# Patient Record
Sex: FEMALE | Race: WHITE | Employment: PART TIME | URBAN - METROPOLITAN AREA
[De-identification: names, ages, dates, MRNs, and addresses within clinical notes are randomized per-mention and may not be internally consistent; named-entity substitution may affect disease eponyms.]

---

## 2017-04-06 ENCOUNTER — TELEPHONE (OUTPATIENT)
Dept: INTERNAL MEDICINE CLINIC | Facility: CLINIC | Age: 57
End: 2017-04-06

## 2017-04-06 NOTE — TELEPHONE ENCOUNTER
Lm w/ pt's , Sharyle Argue ([id number] [address] per HIPAA), to have pt call back regarding need udpated ACT and AAP.  verbalized understanding, stating will inform pt.

## 2017-04-14 NOTE — TELEPHONE ENCOUNTER
Called patient, advised and reviewed confirmed AAP. Pt verbalized understanding and agreeable to plan.

## 2017-12-08 ENCOUNTER — PATIENT MESSAGE (OUTPATIENT)
Dept: INTERNAL MEDICINE CLINIC | Facility: CLINIC | Age: 57
End: 2017-12-08

## 2017-12-08 DIAGNOSIS — Z12.39 ENCOUNTER FOR SCREENING FOR MALIGNANT NEOPLASM OF BREAST: Primary | ICD-10-CM

## 2017-12-21 NOTE — TELEPHONE ENCOUNTER
From: Niko Lugo  To: JOHNNY Bashir  Sent: 12/8/2017 5:25 PM CST  Subject: Other    Please note that I had my routine colonoscopy done by Dr. Jarod Traore of [address] [facility] yesterday, 12/7/17.  She did remove one polyp and the pathology re

## 2018-01-22 ENCOUNTER — MED REC SCAN ONLY (OUTPATIENT)
Dept: INTERNAL MEDICINE CLINIC | Facility: CLINIC | Age: 58
End: 2018-01-22

## 2018-02-13 ENCOUNTER — HOSPITAL ENCOUNTER (EMERGENCY)
Facility: HOSPITAL | Age: 58
Discharge: HOME | End: 2018-02-13
Attending: EMERGENCY MEDICINE
Payer: COMMERCIAL

## 2018-02-13 VITALS
DIASTOLIC BLOOD PRESSURE: 60 MMHG | OXYGEN SATURATION: 98 % | HEIGHT: 67 IN | HEART RATE: 70 BPM | SYSTOLIC BLOOD PRESSURE: 133 MMHG | WEIGHT: 140 LBS | RESPIRATION RATE: 18 BRPM | BODY MASS INDEX: 21.97 KG/M2 | TEMPERATURE: 98 F

## 2018-02-13 DIAGNOSIS — K62.5 RECTAL BLEEDING: Primary | ICD-10-CM

## 2018-02-13 LAB
ALBUMIN SERPL-MCNC: 4.2 G/DL (ref 3.5–4.8)
ALP LIVER SERPL-CCNC: 95 U/L (ref 46–118)
ALT SERPL-CCNC: 20 U/L (ref 14–54)
AST SERPL-CCNC: 25 U/L (ref 15–41)
BASOPHILS # BLD AUTO: 0.06 X10(3) UL (ref 0–0.1)
BASOPHILS NFR BLD AUTO: 0.5 %
BILIRUB SERPL-MCNC: 0.4 MG/DL (ref 0.1–2)
BILIRUB UR QL STRIP.AUTO: NEGATIVE
BUN BLD-MCNC: 18 MG/DL (ref 8–20)
CALCIUM BLD-MCNC: 9.3 MG/DL (ref 8.3–10.3)
CHLORIDE: 108 MMOL/L (ref 101–111)
CLARITY UR REFRACT.AUTO: CLEAR
CO2: 26 MMOL/L (ref 22–32)
CREAT BLD-MCNC: 0.85 MG/DL (ref 0.55–1.02)
EOSINOPHIL # BLD AUTO: 0.17 X10(3) UL (ref 0–0.3)
EOSINOPHIL NFR BLD AUTO: 1.5 %
ERYTHROCYTE [DISTWIDTH] IN BLOOD BY AUTOMATED COUNT: 13.1 % (ref 11.5–16)
GLUCOSE BLD-MCNC: 116 MG/DL (ref 70–99)
GLUCOSE UR STRIP.AUTO-MCNC: NEGATIVE MG/DL
HCT VFR BLD AUTO: 45.2 % (ref 34–50)
HGB BLD-MCNC: 15.7 G/DL (ref 12–16)
IMMATURE GRANULOCYTE COUNT: 0.04 X10(3) UL (ref 0–1)
IMMATURE GRANULOCYTE RATIO %: 0.4 %
KETONES UR STRIP.AUTO-MCNC: NEGATIVE MG/DL
LEUKOCYTE ESTERASE UR QL STRIP.AUTO: NEGATIVE
LYMPHOCYTES # BLD AUTO: 1.89 X10(3) UL (ref 0.9–4)
LYMPHOCYTES NFR BLD AUTO: 16.5 %
M PROTEIN MFR SERPL ELPH: 7.3 G/DL (ref 6.1–8.3)
MCH RBC QN AUTO: 31.6 PG (ref 27–33.2)
MCHC RBC AUTO-ENTMCNC: 34.7 G/DL (ref 31–37)
MCV RBC AUTO: 90.9 FL (ref 81–100)
MONOCYTES # BLD AUTO: 0.82 X10(3) UL (ref 0.1–1)
MONOCYTES NFR BLD AUTO: 7.2 %
NEUTROPHIL ABS PRELIM: 8.44 X10 (3) UL (ref 1.3–6.7)
NEUTROPHILS # BLD AUTO: 8.44 X10(3) UL (ref 1.3–6.7)
NEUTROPHILS NFR BLD AUTO: 73.9 %
NITRITE UR QL STRIP.AUTO: NEGATIVE
PH UR STRIP.AUTO: 6 [PH] (ref 4.5–8)
PLATELET # BLD AUTO: 239 10(3)UL (ref 150–450)
POTASSIUM SERPL-SCNC: 3.8 MMOL/L (ref 3.6–5.1)
PROT UR STRIP.AUTO-MCNC: NEGATIVE MG/DL
RBC # BLD AUTO: 4.97 X10(6)UL (ref 3.8–5.1)
RBC UR QL AUTO: NEGATIVE
RED CELL DISTRIBUTION WIDTH-SD: 43.4 FL (ref 35.1–46.3)
SODIUM SERPL-SCNC: 141 MMOL/L (ref 136–144)
SP GR UR STRIP.AUTO: 1.01 (ref 1–1.03)
UROBILINOGEN UR STRIP.AUTO-MCNC: <2 MG/DL
WBC # BLD AUTO: 11.4 X10(3) UL (ref 4–13)

## 2018-02-13 PROCEDURE — 96361 HYDRATE IV INFUSION ADD-ON: CPT

## 2018-02-13 PROCEDURE — 80053 COMPREHEN METABOLIC PANEL: CPT

## 2018-02-13 PROCEDURE — 99284 EMERGENCY DEPT VISIT MOD MDM: CPT

## 2018-02-13 PROCEDURE — 82272 OCCULT BLD FECES 1-3 TESTS: CPT

## 2018-02-13 PROCEDURE — 80053 COMPREHEN METABOLIC PANEL: CPT | Performed by: EMERGENCY MEDICINE

## 2018-02-13 PROCEDURE — 81003 URINALYSIS AUTO W/O SCOPE: CPT | Performed by: EMERGENCY MEDICINE

## 2018-02-13 PROCEDURE — 85025 COMPLETE CBC W/AUTO DIFF WBC: CPT | Performed by: EMERGENCY MEDICINE

## 2018-02-13 PROCEDURE — 96360 HYDRATION IV INFUSION INIT: CPT

## 2018-02-13 RX ORDER — HYOSCYAMINE SULFATE 0.125 MG
125 TABLET ORAL EVERY 4 HOURS PRN
Qty: 12 TABLET | Refills: 0 | Status: SHIPPED | OUTPATIENT
Start: 2018-02-13 | End: 2018-05-31 | Stop reason: ALTCHOICE

## 2018-02-13 NOTE — ED INITIAL ASSESSMENT (HPI)
Diarrhea x5 this am starting at 0230. Bloody clots in toilet at 0600. No vomiting. Pt has been taking 4 aleve per day x2 days. Colonoscopy in December with 1 polyp removed.

## 2018-02-13 NOTE — ED PROVIDER NOTES
Patient Seen in: [facility] Emergency Department    History   Patient presents with:  GI Bleeding (gastrointestinal)    Stated Complaint: GI Bleed    HPI    76-year-old female presents with rectal bleeding.   She reports multiple episodes of diarrhea masses. No peritoneal signs. Rectal: There is an uncomplicated external hemorrhoid. Small rectal fissure with no evidence of bleeding. No stool in the rectal vault but fluid present is guaiac positive. No gross blood.   Extremities: no edema, normal pe patient's diarrhea. He would like to collect a stool sample if possible. She has been unable to provide a stool sample here in the emergency room so she is sent home with a container to collect.   Instructed to call gastroenterology for follow-up in the n

## 2018-05-30 ENCOUNTER — TELEPHONE (OUTPATIENT)
Dept: INTERNAL MEDICINE CLINIC | Facility: CLINIC | Age: 58
End: 2018-05-30

## 2018-05-30 NOTE — TELEPHONE ENCOUNTER
Pt stating [facility] and dx with bronchitis and sinus infection. Given 7 day course of \"generic\" Augmentin 875- 125mg- 2 days left on regimen. Given tessalon for cough- which has improved, has not needed for 2days.  Noted today af

## 2018-05-30 NOTE — TELEPHONE ENCOUNTER
Patient states [facility]. She was diagnosed with bronchitis and sinus infection. She noticed a rash today, under breast area. She is wanting to know if it may have something to do with medications prescribed?     Unable to tra

## 2018-05-31 ENCOUNTER — OFFICE VISIT (OUTPATIENT)
Dept: INTERNAL MEDICINE CLINIC | Facility: CLINIC | Age: 58
End: 2018-05-31

## 2018-05-31 VITALS
RESPIRATION RATE: 14 BRPM | HEIGHT: 67 IN | WEIGHT: 152 LBS | SYSTOLIC BLOOD PRESSURE: 122 MMHG | HEART RATE: 66 BPM | DIASTOLIC BLOOD PRESSURE: 70 MMHG | TEMPERATURE: 98 F | BODY MASS INDEX: 23.86 KG/M2

## 2018-05-31 DIAGNOSIS — R21 RASH: ICD-10-CM

## 2018-05-31 DIAGNOSIS — Z91.09 ENVIRONMENTAL ALLERGIES: ICD-10-CM

## 2018-05-31 DIAGNOSIS — J06.9 ACUTE URI: Primary | ICD-10-CM

## 2018-05-31 DIAGNOSIS — J45.20 MILD INTERMITTENT ASTHMA WITHOUT COMPLICATION: ICD-10-CM

## 2018-05-31 DIAGNOSIS — Z00.00 LABORATORY EXAMINATION ORDERED AS PART OF A COMPLETE PHYSICAL EXAMINATION: ICD-10-CM

## 2018-05-31 PROCEDURE — 99204 OFFICE O/P NEW MOD 45 MIN: CPT | Performed by: NURSE PRACTITIONER

## 2018-05-31 RX ORDER — AMOXICILLIN AND CLAVULANATE POTASSIUM 875; 125 MG/1; MG/1
1 TABLET, FILM COATED ORAL 2 TIMES DAILY
Qty: 8 TABLET | Refills: 0 | Status: SHIPPED | OUTPATIENT
Start: 2018-05-31 | End: 2018-06-10

## 2018-05-31 RX ORDER — BENZONATATE 100 MG/1
CAPSULE ORAL
COMMUNITY
Start: 2018-05-25 | End: 2018-05-31 | Stop reason: ALTCHOICE

## 2018-05-31 RX ORDER — AMOXICILLIN AND CLAVULANATE POTASSIUM 875; 125 MG/1; MG/1
TABLET, FILM COATED ORAL
COMMUNITY
Start: 2018-05-25 | End: 2018-08-27 | Stop reason: ALTCHOICE

## 2018-05-31 NOTE — PROGRESS NOTES
Ale Mitchell is a 59year old female. Patient presents with:  Rash: AB RM 10, Pt states having rash under breast X 1 day   Medication Request  Sinusitis      HPI:   Last ov 2015  Multiple issues. Overdue for health maintenance/labs/CPE/pap.   She is acu done recently [facility]   Will request record  Pap: due   Menstrual Cycle: post         L   Mammogram: overdue   Order placed. Bone Density:  Will discuss further at her CPE    Osteoperosis Prevention:    Osteoperosis Prevention was discussed.   Reviewe Amoxicillin-Pot Clavulanate 875-125 MG Oral Tab 8 tablet 0      Sig: Take 1 tablet by mouth 2 (two) times daily. Imaging & Consults:  None    No Follow-up on file. There are no Patient Instructions on file for this visit.

## 2018-08-27 ENCOUNTER — OFFICE VISIT (OUTPATIENT)
Dept: FAMILY MEDICINE CLINIC | Facility: CLINIC | Age: 58
End: 2018-08-27
Payer: COMMERCIAL

## 2018-08-27 VITALS
BODY MASS INDEX: 23.27 KG/M2 | HEIGHT: 67.5 IN | RESPIRATION RATE: 15 BRPM | DIASTOLIC BLOOD PRESSURE: 76 MMHG | WEIGHT: 150 LBS | HEART RATE: 84 BPM | SYSTOLIC BLOOD PRESSURE: 118 MMHG

## 2018-08-27 DIAGNOSIS — N30.00 ACUTE CYSTITIS WITHOUT HEMATURIA: Primary | ICD-10-CM

## 2018-08-27 LAB
APPEARANCE: CLEAR
MULTISTIX LOT#: NORMAL NUMERIC
PH, URINE: 6 (ref 4.5–8)
SPECIFIC GRAVITY: 1 (ref 1–1.03)
UROBILINOGEN,SEMI-QN: 0.2 MG/DL (ref 0–1.9)

## 2018-08-27 PROCEDURE — 87088 URINE BACTERIA CULTURE: CPT | Performed by: FAMILY MEDICINE

## 2018-08-27 PROCEDURE — 87086 URINE CULTURE/COLONY COUNT: CPT | Performed by: FAMILY MEDICINE

## 2018-08-27 PROCEDURE — 99213 OFFICE O/P EST LOW 20 MIN: CPT | Performed by: FAMILY MEDICINE

## 2018-08-27 PROCEDURE — 87186 SC STD MICRODIL/AGAR DIL: CPT | Performed by: FAMILY MEDICINE

## 2018-08-27 PROCEDURE — 81003 URINALYSIS AUTO W/O SCOPE: CPT | Performed by: FAMILY MEDICINE

## 2018-08-27 RX ORDER — CIPROFLOXACIN 500 MG/1
500 TABLET, FILM COATED ORAL 2 TIMES DAILY
Qty: 10 TABLET | Refills: 0 | Status: SHIPPED | OUTPATIENT
Start: 2018-08-27 | End: 2018-09-01

## 2018-08-27 NOTE — PROGRESS NOTES
Maude Bustillo is a 59year old female. HPI:   Patient presents with symptoms of UTI for 2 days. Complaining of urinary frequency, urgency, dysuria, mild suprapubic pain. Denies back pain, fever, hematuria.   Pt has remote history of UTI in past. 1.030   OCCULT BLOOD trace Negative   PH, URINE 6.0 4.5 - 8.0   PROTEIN (URINE DIPSTICK) neg Negative/Trace mg/dL   UROBILINOGEN,SEMI-QN 0.2 0.0 - 1.9 mg/dL   NITRITE, URINE neg Negative   LEUKOCYTES neg Negative   APPEARANCE clear Clear   URINE-COLOR tace

## 2018-08-27 NOTE — PATIENT INSTRUCTIONS
Take antibiotics with food and plenty of water. Eat yogurt daily or use probiotics. (Femdophilus is a good example of an OTC probiotic)  Make sure to finish the entire antibiotic treatment.   We will send the urine specimen for culture and call you in 2-3

## 2018-09-28 ENCOUNTER — HOSPITAL ENCOUNTER (OUTPATIENT)
Dept: MAMMOGRAPHY | Age: 58
Discharge: HOME | End: 2018-09-28
Attending: NURSE PRACTITIONER
Payer: COMMERCIAL

## 2018-09-28 DIAGNOSIS — Z12.39 ENCOUNTER FOR SCREENING FOR MALIGNANT NEOPLASM OF BREAST: ICD-10-CM

## 2018-09-28 PROCEDURE — 77063 BREAST TOMOSYNTHESIS BI: CPT | Performed by: NURSE PRACTITIONER

## 2018-09-28 PROCEDURE — 77067 SCR MAMMO BI INCL CAD: CPT | Performed by: NURSE PRACTITIONER

## 2018-12-05 ENCOUNTER — OFFICE VISIT (OUTPATIENT)
Dept: FAMILY MEDICINE CLINIC | Facility: CLINIC | Age: 58
End: 2018-12-05
Payer: COMMERCIAL

## 2018-12-05 VITALS
TEMPERATURE: 98 F | OXYGEN SATURATION: 98 % | SYSTOLIC BLOOD PRESSURE: 118 MMHG | HEART RATE: 75 BPM | DIASTOLIC BLOOD PRESSURE: 70 MMHG

## 2018-12-05 DIAGNOSIS — N30.00 ACUTE CYSTITIS WITHOUT HEMATURIA: ICD-10-CM

## 2018-12-05 DIAGNOSIS — R30.0 DYSURIA: Primary | ICD-10-CM

## 2018-12-05 PROCEDURE — 99213 OFFICE O/P EST LOW 20 MIN: CPT | Performed by: FAMILY MEDICINE

## 2018-12-05 PROCEDURE — 81003 URINALYSIS AUTO W/O SCOPE: CPT | Performed by: FAMILY MEDICINE

## 2018-12-05 RX ORDER — CIPROFLOXACIN 500 MG/1
500 TABLET, FILM COATED ORAL 2 TIMES DAILY
Qty: 10 TABLET | Refills: 0 | Status: SHIPPED | OUTPATIENT
Start: 2018-12-05 | End: 2018-12-10

## 2018-12-05 NOTE — PATIENT INSTRUCTIONS
Take antibiotics with food and plenty of water. Eat yogurt daily or use probiotics. (Berenice Freitas is a good example of an OTC probiotic)  Make sure to finish the entire antibiotic treatment. You may take otc pyridium for urinary discomfort if needed.    Increase

## 2018-12-05 NOTE — PROGRESS NOTES
Bernabe Sandoval is a 59year old female. HPI:   Patient presents with symptoms of UTI for 3 days. Complaining of urinary frequency, urgency, dysuria, mild suprapubic pain. Denies back pain, fever, hematuria.   Pt has history of UTI in past.  Last episod Negative    KETONES (URINE DIPSTICK) Neg Negative mg/dL    SPECIFIC GRAVITY 1.010 1.005 - 1.030    OCCULT BLOOD Mod Negative    PH, URINE 6.5 4.5 - 8.0    PROTEIN (URINE DIPSTICK) neg Negative/Trace mg/dL    UROBILINOGEN,SEMI-QN 0.2 0.0 - 1.9 mg/dL    NITR

## 2019-01-29 ENCOUNTER — OFFICE VISIT (OUTPATIENT)
Dept: FAMILY MEDICINE CLINIC | Facility: CLINIC | Age: 59
End: 2019-01-29
Payer: COMMERCIAL

## 2019-01-29 VITALS
HEART RATE: 78 BPM | DIASTOLIC BLOOD PRESSURE: 66 MMHG | OXYGEN SATURATION: 98 % | RESPIRATION RATE: 14 BRPM | TEMPERATURE: 99 F | WEIGHT: 145 LBS | BODY MASS INDEX: 22.49 KG/M2 | HEIGHT: 67.5 IN | SYSTOLIC BLOOD PRESSURE: 120 MMHG

## 2019-01-29 DIAGNOSIS — N30.01 ACUTE CYSTITIS WITH HEMATURIA: Primary | ICD-10-CM

## 2019-01-29 LAB
APPEARANCE: CLEAR
MULTISTIX LOT#: ABNORMAL NUMERIC
PH, URINE: 6 (ref 4.5–8)
SPECIFIC GRAVITY: 1.01 (ref 1–1.03)
URINE-COLOR: YELLOW
UROBILINOGEN,SEMI-QN: 0.2 MG/DL (ref 0–1.9)

## 2019-01-29 PROCEDURE — 99213 OFFICE O/P EST LOW 20 MIN: CPT | Performed by: NURSE PRACTITIONER

## 2019-01-29 PROCEDURE — 87086 URINE CULTURE/COLONY COUNT: CPT | Performed by: NURSE PRACTITIONER

## 2019-01-29 PROCEDURE — 81003 URINALYSIS AUTO W/O SCOPE: CPT | Performed by: NURSE PRACTITIONER

## 2019-01-29 RX ORDER — CEPHALEXIN 500 MG/1
500 CAPSULE ORAL 2 TIMES DAILY
Qty: 20 CAPSULE | Refills: 0 | Status: SHIPPED | OUTPATIENT
Start: 2019-01-29 | End: 2019-02-08

## 2019-01-29 NOTE — PROGRESS NOTES
CHIEF COMPLAINT:   Patient presents with:  Urinary Symptoms (urologic): dysuria, urgency x 4 dys was seen on Dec, 5th for same sx       HPI:   Maude Bustillo is a 59year old female who presents with symptoms of UTI.  Complaining of urinary frequency, urg murmurs  LUNGS: clear to ausculation bilaterally, no wheezing or rhonchi  GI: BS present x 4. No hepatosplenomegaly, No tenderness  : Mild suprapubic tenderness; No bladder distention;  No CVAT   EXTREMITIES: no edema    Recent Results (from the past 24 primary care provider immediately if you experience nausea, vomiting, or fever. What can you do to help prevent bladder infections? Urinate often during the day. You should also urinate after you have sex.    If you are a woman, it is important to:   Ke

## 2019-03-09 ENCOUNTER — OFFICE VISIT (OUTPATIENT)
Dept: FAMILY MEDICINE CLINIC | Facility: CLINIC | Age: 59
End: 2019-03-09
Payer: COMMERCIAL

## 2019-03-09 VITALS
HEART RATE: 75 BPM | DIASTOLIC BLOOD PRESSURE: 72 MMHG | BODY MASS INDEX: 22.76 KG/M2 | HEIGHT: 67 IN | OXYGEN SATURATION: 99 % | SYSTOLIC BLOOD PRESSURE: 122 MMHG | WEIGHT: 145 LBS | TEMPERATURE: 98 F | RESPIRATION RATE: 14 BRPM

## 2019-03-09 DIAGNOSIS — N30.01 ACUTE CYSTITIS WITH HEMATURIA: Primary | ICD-10-CM

## 2019-03-09 LAB
APPEARANCE: CLEAR
PH, URINE: 7 (ref 4.5–8)
SPECIFIC GRAVITY: <=1.005 (ref 1–1.03)
URINE-COLOR: YELLOW
UROBILINOGEN,SEMI-QN: 0.2 MG/DL (ref 0–1.9)

## 2019-03-09 PROCEDURE — 87086 URINE CULTURE/COLONY COUNT: CPT | Performed by: NURSE PRACTITIONER

## 2019-03-09 PROCEDURE — 81003 URINALYSIS AUTO W/O SCOPE: CPT | Performed by: NURSE PRACTITIONER

## 2019-03-09 PROCEDURE — 99213 OFFICE O/P EST LOW 20 MIN: CPT | Performed by: NURSE PRACTITIONER

## 2019-03-09 RX ORDER — NITROFURANTOIN 25; 75 MG/1; MG/1
100 CAPSULE ORAL 2 TIMES DAILY
Qty: 14 CAPSULE | Refills: 0 | Status: SHIPPED | OUTPATIENT
Start: 2019-03-09 | End: 2019-03-16

## 2019-03-09 NOTE — PATIENT INSTRUCTIONS
· Complete full course of antibiotics. · Over the counter Tylenol/Motrin as needed for pain/discomfort. · Follow up in 2-3 days if symptoms do not improve or worsen.     · Urine culture sent  · Return to clinic or see your primary care provider immediatel until all of the medicine is gone. If you stop taking the antibiotic too soon, the infection may not go away. You may also develop a resistance to the antibiotic. This can make it much harder to treat.   Lifestyle changes to treat and prevent UTIs  The life

## 2019-03-09 NOTE — PROGRESS NOTES
CHIEF COMPLAINT:   Patient presents with:  Urinary Symptoms (urologic): states this is her 4th UTI since August, hematuria, started last night       HPI:   Jess Kaur is a 62year old female who presents with symptoms of UTI.  Complaining of urinary f LUNGS: clear to ausculation bilaterally, no wheezing or rhonchi  GI: BS present x 4. No hepatosplenomegaly, No tenderness  : No suprapubic tenderness; no bladder distention;  No CVAT   EXTREMITIES: no edema    Recent Results (from the past 24 hour(s)) Sig: Take 1 capsule (100 mg total) by mouth 2 (two) times daily for 7 days. Patient Instructions   · Complete full course of antibiotics. · Over the counter Tylenol/Motrin as needed for pain/discomfort.   · Follow up in 2-3 days if symptoms do no Most UTIs are treated with antibiotics. These kill the bacteria. The length of time you need to take them depends on the type of infection. It may be as short as 3 days. If you have repeated UTIs, you may need a low-dose antibiotic for several months.  Take © 1266-2968 [employer] [address] [facility] [place] [place]. All rights reserved. This information is not intended as a substitute for professional medical care. Always follow your healthcare professional's instructions.               Regine Lao

## 2019-03-11 ENCOUNTER — TELEPHONE (OUTPATIENT)
Dept: INTERNAL MEDICINE CLINIC | Facility: CLINIC | Age: 59
End: 2019-03-11

## 2019-03-11 NOTE — TELEPHONE ENCOUNTER
Spoke with patient stating was seen at [facility] on 3/9/2019 for UTI, patient stating is taking antibiotic given at [facility] still feels bladder pressure, no hematuria since Saturday, no dysuria, no fevers, no chills, no nausea, no vomiting.  Patient is scheduled with

## 2019-03-11 NOTE — TELEPHONE ENCOUNTER
Pt went to [facility] 3/9/19 and has uti again-they told her to check w/pcp and see if maybe she should go to urologist now since she has had several uti's lately-this uti did have blood in it as well

## 2019-03-11 NOTE — TELEPHONE ENCOUNTER
Pt called again to find out what she is to do-is hoping to get a call back today to see what she needs to do.

## 2019-03-11 NOTE — TELEPHONE ENCOUNTER
Patient has had multiple visits at [facility] for UTI, she currently is taking antibiotic Nitrofurantoin prescribed by Barbara Wong on 3/9/2019 negative for infection but showed hematuria, last dose will be 3/16/2019, would you like her to come in for FU OV here firs

## 2019-03-12 NOTE — TELEPHONE ENCOUNTER
LM on  at [phone] asking patient to call back if would like to be seen today, per SD patient should keep appt with CB. Patient to call back with any further questions.

## 2019-03-12 NOTE — TELEPHONE ENCOUNTER
Please confirm she is stable to wait until CB tomorrow. Will possibly need to see urology but will not be able to see them soon.   Should keep her appt with CB

## 2019-03-13 ENCOUNTER — OFFICE VISIT (OUTPATIENT)
Dept: INTERNAL MEDICINE CLINIC | Facility: CLINIC | Age: 59
End: 2019-03-13
Payer: COMMERCIAL

## 2019-03-13 VITALS
BODY MASS INDEX: 23.86 KG/M2 | DIASTOLIC BLOOD PRESSURE: 78 MMHG | SYSTOLIC BLOOD PRESSURE: 130 MMHG | HEART RATE: 84 BPM | WEIGHT: 152 LBS | RESPIRATION RATE: 16 BRPM | HEIGHT: 67 IN

## 2019-03-13 DIAGNOSIS — R31.0 GROSS HEMATURIA: Primary | ICD-10-CM

## 2019-03-13 DIAGNOSIS — R30.0 DYSURIA: ICD-10-CM

## 2019-03-13 DIAGNOSIS — J45.20 MILD INTERMITTENT ASTHMA WITHOUT COMPLICATION: ICD-10-CM

## 2019-03-13 LAB
APPEARANCE: CLEAR
BILIRUBIN: NEGATIVE
GLUCOSE (URINE DIPSTICK): NEGATIVE MG/DL
LEUKOCYTES: NEGATIVE
MULTISTIX LOT#: NORMAL NUMERIC
NITRITE, URINE: NEGATIVE
OCCULT BLOOD: NEGATIVE
PH, URINE: 5.5 (ref 4.5–8)
PROTEIN (URINE DIPSTICK): NEGATIVE MG/DL
SPECIFIC GRAVITY: 1 (ref 1–1.03)
URINE-COLOR: YELLOW
UROBILINOGEN,SEMI-QN: 0.2 MG/DL (ref 0–1.9)

## 2019-03-13 PROCEDURE — 81003 URINALYSIS AUTO W/O SCOPE: CPT | Performed by: PHYSICIAN ASSISTANT

## 2019-03-13 PROCEDURE — 99214 OFFICE O/P EST MOD 30 MIN: CPT | Performed by: PHYSICIAN ASSISTANT

## 2019-03-13 NOTE — PROGRESS NOTES
Patient presents with:  UTI: Pt is here for a f/u from  for UTI. Pt states she is having frequent UTI's since 8/27/18. Pt states she is no longer having symptoms and currently taking antibiotics. LB-rm 2      HPI:  Pt presents for f/u of dysuria.   States noted. No erythema. No pallor. ACT:  25    UA:  negative    A/P:    Gross hematuria  (primary encounter diagnosis) - Will need full work-up, pt is in agreement. CT abd pelvis ordered. Pt also referred to Urology ([facility] or [facility]) for cystoscopy.     Dysur

## 2019-03-14 ENCOUNTER — TELEPHONE (OUTPATIENT)
Dept: INTERNAL MEDICINE CLINIC | Facility: CLINIC | Age: 59
End: 2019-03-14

## 2019-03-14 NOTE — TELEPHONE ENCOUNTER
Spoke to patient regarding her auth for her upcoming CT of abdomen and pelvis. I explained to her it is still pending auth. Pt states she has it scheduled for next Weds. 3/20/19. She will call me back on Tues. 3/19/19 to see it test has been approved.

## 2019-03-20 ENCOUNTER — HOSPITAL ENCOUNTER (OUTPATIENT)
Dept: CT IMAGING | Facility: HOSPITAL | Age: 59
Discharge: HOME | End: 2019-03-20
Attending: PHYSICIAN ASSISTANT
Payer: COMMERCIAL

## 2019-03-20 DIAGNOSIS — R31.0 GROSS HEMATURIA: ICD-10-CM

## 2019-03-20 LAB — CREAT SERPL-MCNC: 0.8 MG/DL (ref 0.55–1.02)

## 2019-03-20 PROCEDURE — 82565 ASSAY OF CREATININE: CPT

## 2019-03-20 PROCEDURE — 74178 CT ABD&PLV WO CNTR FLWD CNTR: CPT | Performed by: PHYSICIAN ASSISTANT

## 2019-03-20 NOTE — TELEPHONE ENCOUNTER
Pt would like Radha STARKEY to call her to discuss if the MRI is covered by her insurance.       Pls call Pt at .[phone]

## 2019-03-20 NOTE — PROGRESS NOTES
Results reviewed with pt. L kidney stone, non-obstructive. Pt has appt with Urology, Angelika next month. Also discussed gallbladder finding, fold vs mass vs stone. She was informed of need to do MRI/MRCP to further evaluate.   She is agreeable and will proc

## 2019-03-28 ENCOUNTER — HOSPITAL ENCOUNTER (OUTPATIENT)
Dept: MRI IMAGING | Facility: HOSPITAL | Age: 59
Discharge: HOME | End: 2019-03-28
Attending: PHYSICIAN ASSISTANT
Payer: COMMERCIAL

## 2019-03-28 DIAGNOSIS — R93.5 ABNORMAL CT OF THE ABDOMEN: ICD-10-CM

## 2019-03-28 PROCEDURE — 74183 MRI ABD W/O CNTR FLWD CNTR: CPT | Performed by: PHYSICIAN ASSISTANT

## 2019-03-28 PROCEDURE — 76376 3D RENDER W/INTRP POSTPROCES: CPT | Performed by: PHYSICIAN ASSISTANT

## 2019-03-28 PROCEDURE — A9575 INJ GADOTERATE MEGLUMI 0.1ML: HCPCS | Performed by: PHYSICIAN ASSISTANT

## 2019-03-29 ENCOUNTER — TELEPHONE (OUTPATIENT)
Dept: INTERNAL MEDICINE CLINIC | Facility: CLINIC | Age: 59
End: 2019-03-29

## 2019-03-29 NOTE — PROGRESS NOTES
I called and reviewed results with pt personally. Either gallbladder fold or normal variant (Phrygian cap). Discussed with Dr. Valentino Phelan. Will plan for US in 3-4 mos to re-eval.  Pt agreeable to plan. All questions were answered.   She will f/u with me

## 2019-04-11 ENCOUNTER — HOSPITAL ENCOUNTER (OUTPATIENT)
Dept: ULTRASOUND IMAGING | Facility: HOSPITAL | Age: 59
Discharge: HOME | End: 2019-04-11
Attending: PHYSICIAN ASSISTANT
Payer: COMMERCIAL

## 2019-04-11 DIAGNOSIS — Z86.018 HISTORY OF UTERINE FIBROID: ICD-10-CM

## 2019-04-11 DIAGNOSIS — R14.0 ABDOMINAL BLOATING: ICD-10-CM

## 2019-04-11 PROCEDURE — 76830 TRANSVAGINAL US NON-OB: CPT | Performed by: PHYSICIAN ASSISTANT

## 2019-04-11 PROCEDURE — 76856 US EXAM PELVIC COMPLETE: CPT | Performed by: PHYSICIAN ASSISTANT

## 2019-04-11 NOTE — PROGRESS NOTES
Two uterine fibroids. One does appear to be exophytic (outward instead of inward on the uterus) and is over 2 cm in size. I would suggest she f/u with Gyn regarding this.   If she does not have a Gyn then recommend Bella Barraza with [employer] [facility].

## 2019-08-04 ENCOUNTER — PATIENT OUTREACH (OUTPATIENT)
Dept: INTERNAL MEDICINE CLINIC | Facility: CLINIC | Age: 59
End: 2019-08-04

## 2019-10-03 ENCOUNTER — TELEPHONE (OUTPATIENT)
Dept: INTERNAL MEDICINE CLINIC | Facility: CLINIC | Age: 59
End: 2019-10-03

## 2019-10-03 DIAGNOSIS — Z13.0 SCREENING FOR BLOOD DISEASE: ICD-10-CM

## 2019-10-03 DIAGNOSIS — Z12.31 ENCOUNTER FOR SCREENING MAMMOGRAM FOR MALIGNANT NEOPLASM OF BREAST: Primary | ICD-10-CM

## 2019-10-03 DIAGNOSIS — Z13.220 SCREENING FOR LIPOID DISORDERS: ICD-10-CM

## 2019-10-03 DIAGNOSIS — Z13.29 SCREENING FOR THYROID DISORDER: ICD-10-CM

## 2019-10-03 DIAGNOSIS — Z13.228 SCREENING FOR METABOLIC DISORDER: ICD-10-CM

## 2019-10-03 DIAGNOSIS — Z00.00 ROUTINE GENERAL MEDICAL EXAMINATION AT A HEALTH CARE FACILITY: Primary | ICD-10-CM

## 2019-10-03 DIAGNOSIS — J45.20 MILD INTERMITTENT ASTHMA WITHOUT COMPLICATION: ICD-10-CM

## 2019-10-03 NOTE — TELEPHONE ENCOUNTER
9/28/18 last mammogram-Bilateral Screening Juan 2D 3D Mammogram ordered per protocol. Send TE to [facility] for [facility]PE labs for 11/6/19 appt.

## 2019-10-03 NOTE — TELEPHONE ENCOUNTER
Future Appointments   Date Time Provider Janie Sharlene   11/6/2019  8:00 AM Jannette Funez PA-C [facility]     Please place orders for Mammogram with [phone] Patient is having physical with At[facility].

## 2019-10-03 NOTE — TELEPHONE ENCOUNTER
Patient is scheduled for Annual Physical.  Please place orders with [facility].  Patient will be fasting.     Future Appointments   Date Time Provider Janie uS   11/6/2019  8:00 AM Mini Miguel PA-C [facility]

## 2019-10-16 ENCOUNTER — HOSPITAL ENCOUNTER (OUTPATIENT)
Dept: MAMMOGRAPHY | Age: 59
Discharge: HOME | End: 2019-10-16
Attending: PHYSICIAN ASSISTANT
Payer: COMMERCIAL

## 2019-10-16 DIAGNOSIS — Z12.31 ENCOUNTER FOR SCREENING MAMMOGRAM FOR MALIGNANT NEOPLASM OF BREAST: ICD-10-CM

## 2019-10-16 PROCEDURE — 77067 SCR MAMMO BI INCL CAD: CPT | Performed by: PHYSICIAN ASSISTANT

## 2019-10-16 PROCEDURE — 77063 BREAST TOMOSYNTHESIS BI: CPT | Performed by: PHYSICIAN ASSISTANT

## 2019-10-28 NOTE — TELEPHONE ENCOUNTER
Orders were not placed to [facility]. Please placed them to [facility] pt would like a call back [phone]

## 2019-11-04 DIAGNOSIS — R73.9 HYPERGLYCEMIA: Primary | ICD-10-CM

## 2019-11-04 NOTE — TELEPHONE ENCOUNTER
Pt has upcoming OV on 11/6/19. I placed an order for an A1c to [facility].  Could you please call [facility] and see if they can add to the blood in lab. Thx.  I will review results with pt at [address]

## 2019-11-06 ENCOUNTER — OFFICE VISIT (OUTPATIENT)
Dept: INTERNAL MEDICINE CLINIC | Facility: CLINIC | Age: 59
End: 2019-11-06
Payer: COMMERCIAL

## 2019-11-06 VITALS
HEART RATE: 72 BPM | HEIGHT: 67 IN | BODY MASS INDEX: 24.52 KG/M2 | DIASTOLIC BLOOD PRESSURE: 72 MMHG | SYSTOLIC BLOOD PRESSURE: 110 MMHG | TEMPERATURE: 98 F | WEIGHT: 156.19 LBS

## 2019-11-06 DIAGNOSIS — R93.2 ABNORMAL CT SCAN, GALLBLADDER: ICD-10-CM

## 2019-11-06 DIAGNOSIS — Z11.51 SCREENING FOR HPV (HUMAN PAPILLOMAVIRUS): ICD-10-CM

## 2019-11-06 DIAGNOSIS — Z00.00 ANNUAL PHYSICAL EXAM: Primary | ICD-10-CM

## 2019-11-06 DIAGNOSIS — E78.5 DYSLIPIDEMIA: ICD-10-CM

## 2019-11-06 DIAGNOSIS — J45.20 MILD INTERMITTENT ASTHMA WITHOUT COMPLICATION: ICD-10-CM

## 2019-11-06 DIAGNOSIS — R93.5 ABNORMAL MRI OF ABDOMEN: ICD-10-CM

## 2019-11-06 DIAGNOSIS — R31.9 HEMATURIA, UNSPECIFIED TYPE: ICD-10-CM

## 2019-11-06 DIAGNOSIS — Z12.4 CERVICAL CANCER SCREENING: ICD-10-CM

## 2019-11-06 PROCEDURE — 99396 PREV VISIT EST AGE 40-64: CPT | Performed by: PHYSICIAN ASSISTANT

## 2019-11-06 PROCEDURE — 88175 CYTOPATH C/V AUTO FLUID REDO: CPT | Performed by: PHYSICIAN ASSISTANT

## 2019-11-06 PROCEDURE — 87624 HPV HI-RISK TYP POOLED RSLT: CPT | Performed by: PHYSICIAN ASSISTANT

## 2019-11-06 RX ORDER — ALBUTEROL SULFATE 90 UG/1
2 AEROSOL, METERED RESPIRATORY (INHALATION) EVERY 4 HOURS PRN
Qty: 1 INHALER | Refills: 1 | Status: SHIPPED | OUTPATIENT
Start: 2019-11-06

## 2019-11-06 NOTE — PROGRESS NOTES
Patient presents with:  Physical: Rm 2 DD Physical/Pap  Vaccinations: had 10/19      HPI:  Pt presents for annual physical.  She has not had a pap in years and is due. Pt is in a monogamous relationship. She denies h/o abnormal paps.     Chol - TGs much h Cancer Paternal Aunt    • Other (rectal cancer) Brother      Social History    Tobacco Use      Smoking status: Never Smoker      Smokeless tobacco: Never Used    Alcohol use:  Yes      Alcohol/week: 1.0 standard drinks      Types: 1 Standard drinks or equi Bilateral without masses, axillary nodes, nipple discharge and skin changes  Pulmonary/Chest: Effort normal and breath sounds normal. No respiratory distress. No wheezes, rhonchi or rales  Abdominal: Soft.  Bowel sounds are normal. Non tender, no masses, no 7.3  % Final   • EOSINOPHILS 11/01/2019 1.4  % Final   • BASOPHILS 11/01/2019 1.4  % Final   • GLUCOSE 11/01/2019 107* 65 - 99 mg/dL Final   • UREA NITROGEN (BUN) 11/01/2019 14  7 - 25 mg/dL Final   • CREATININE 11/01/2019 0.89  0.50 - 0.99 mg/dL Final   • (primary encounter diagnosis) - Pap with HPV today. Reviewed SBEs. Screening labs reviewed with pt. Mammo and colonoscopy is UTD. Mild intermittent asthma without complication - Stable. Continue Albuterol prn  Avoid dog dander.   Abnormal mri of abdome

## 2019-11-11 PROBLEM — R31.29 OTHER MICROSCOPIC HEMATURIA: Status: ACTIVE | Noted: 2019-03-09

## 2019-12-31 ENCOUNTER — HOSPITAL ENCOUNTER (OUTPATIENT)
Dept: ULTRASOUND IMAGING | Age: 59
Discharge: HOME | End: 2019-12-31
Attending: PHYSICIAN ASSISTANT
Payer: COMMERCIAL

## 2019-12-31 DIAGNOSIS — R93.5 ABNORMAL MRI OF ABDOMEN: ICD-10-CM

## 2019-12-31 DIAGNOSIS — R93.2 ABNORMAL CT SCAN, GALLBLADDER: ICD-10-CM

## 2019-12-31 DIAGNOSIS — K76.9 LESION OF LIVER: ICD-10-CM

## 2019-12-31 DIAGNOSIS — R93.5 ABNORMAL US (ULTRASOUND) OF ABDOMEN: Primary | ICD-10-CM

## 2019-12-31 PROCEDURE — 76700 US EXAM ABDOM COMPLETE: CPT | Performed by: PHYSICIAN ASSISTANT

## 2019-12-31 NOTE — PROGRESS NOTES
US is showing a couple of findings not definitely seen on prior imaging; these are likely benign hemangiomas (collection of blood vessels). Gallbladder findings appear smaller on US compared to prior imaging.   I would like pt to see Dr. Vivien Mane (GI liver sp

## 2020-01-31 ENCOUNTER — TELEPHONE (OUTPATIENT)
Dept: SURGERY | Facility: CLINIC | Age: 60
End: 2020-01-31

## 2020-01-31 DIAGNOSIS — K76.9 LESION OF LIVER GREATER THAN 1 CM IN DIAMETER WITH NO LIVER DISEASE OR HISTORY OF MALIGNANT NEOPLASM: Primary | ICD-10-CM

## 2020-01-31 NOTE — TELEPHONE ENCOUNTER
Spoke to patient confirming need for consult with Dr. Arturo Benson. Ordered repeat MRI. Patient instructed to get MRI prior to consult. Explained rationale for recommendation and addressed patient's questions.  Patient expressed reluctance but ultimately seemed

## 2020-02-06 ENCOUNTER — HOSPITAL ENCOUNTER (OUTPATIENT)
Dept: MRI IMAGING | Age: 60
Discharge: HOME | End: 2020-02-06
Attending: INTERNAL MEDICINE
Payer: COMMERCIAL

## 2020-02-06 DIAGNOSIS — K76.9 LESION OF LIVER GREATER THAN 1 CM IN DIAMETER WITH NO LIVER DISEASE OR HISTORY OF MALIGNANT NEOPLASM: ICD-10-CM

## 2020-02-06 PROCEDURE — A9581 GADOXETATE DISODIUM INJ: HCPCS

## 2020-02-06 PROCEDURE — 74183 MRI ABD W/O CNTR FLWD CNTR: CPT | Performed by: INTERNAL MEDICINE

## 2020-02-10 ENCOUNTER — TELEPHONE (OUTPATIENT)
Dept: SURGERY | Facility: CLINIC | Age: 60
End: 2020-02-10

## 2020-02-10 DIAGNOSIS — K82.4 GALLBLADDER POLYP: Primary | ICD-10-CM

## 2020-02-11 ENCOUNTER — TELEPHONE (OUTPATIENT)
Dept: SURGERY | Facility: CLINIC | Age: 60
End: 2020-02-11

## 2020-02-11 NOTE — TELEPHONE ENCOUNTER
Left VM confirming receipt and restating message from yesterday stating no further follow-up needed regarding liver lesion; however patient should have repeat imaging done and follow-up in 1 year (ordered) for gallbladder polyp.     Philippe Dorado, APRN  Nurs

## 2021-06-03 ENCOUNTER — HOSPITAL ENCOUNTER (OUTPATIENT)
Age: 61
Discharge: HOME | End: 2021-06-03
Payer: COMMERCIAL

## 2021-06-03 ENCOUNTER — APPOINTMENT (OUTPATIENT)
Dept: GENERAL RADIOLOGY | Age: 61
End: 2021-06-03
Attending: PHYSICIAN ASSISTANT
Payer: COMMERCIAL

## 2021-06-03 VITALS
TEMPERATURE: 98 F | HEART RATE: 73 BPM | WEIGHT: 150 LBS | OXYGEN SATURATION: 98 % | HEIGHT: 67.5 IN | DIASTOLIC BLOOD PRESSURE: 61 MMHG | RESPIRATION RATE: 18 BRPM | SYSTOLIC BLOOD PRESSURE: 130 MMHG | BODY MASS INDEX: 23.27 KG/M2

## 2021-06-03 DIAGNOSIS — S92.355A CLOSED NONDISPLACED FRACTURE OF FIFTH METATARSAL BONE OF LEFT FOOT, INITIAL ENCOUNTER: Primary | ICD-10-CM

## 2021-06-03 PROCEDURE — 73590 X-RAY EXAM OF LOWER LEG: CPT | Performed by: PHYSICIAN ASSISTANT

## 2021-06-03 PROCEDURE — E0114 CRUTCH UNDERARM PAIR NO WOOD: HCPCS | Performed by: PHYSICIAN ASSISTANT

## 2021-06-03 PROCEDURE — 29515 APPLICATION SHORT LEG SPLINT: CPT | Performed by: PHYSICIAN ASSISTANT

## 2021-06-03 PROCEDURE — 73630 X-RAY EXAM OF FOOT: CPT | Performed by: PHYSICIAN ASSISTANT

## 2021-06-03 PROCEDURE — 99213 OFFICE O/P EST LOW 20 MIN: CPT | Performed by: PHYSICIAN ASSISTANT

## 2021-06-03 RX ORDER — IBUPROFEN 600 MG/1
600 TABLET ORAL ONCE
Status: COMPLETED | OUTPATIENT
Start: 2021-06-03 | End: 2021-06-03

## 2021-06-03 NOTE — ED PROVIDER NOTES
Patient Seen in: Immediate [address]      History   Patient presents with:  Leg or Foot Injury    Stated Complaint: LEFT FOOT PAIN    HPI/Subjective:   HPI    75-year-old female presents to the immediate care with left foot pain since yesterd is normal.   Musculoskeletal:      Cervical back: Normal range of motion and neck supple. Comments: Mild pain elicited along left fifth metatarsal.  Contusion noted to the dorsum of left foot. 2+ DP pulse.     Mild pain elicited over left distal tib-f arthropathy or acute abnormality. SOFT TISSUES:  Negative. No visible soft tissue swelling. EFFUSION:  None visible. OTHER:  Negative. CONCLUSION:  There is no acute fracture left tibia or fibula.    Dictated by (CST): Nataliia Block MD on 6/03/2

## 2021-06-04 ENCOUNTER — TELEPHONE (OUTPATIENT)
Dept: ORTHOPEDICS CLINIC | Facility: CLINIC | Age: 61
End: 2021-06-04

## 2021-06-04 NOTE — TELEPHONE ENCOUNTER
This patient is looking for an appointment following an ER visit yesterday for an Oblique fracture mid to distal 5th metatarsal bone. Xrays are in [address] Would Dr. Fred Kwong be able to see the patient? Please advise.

## 2021-06-04 NOTE — TELEPHONE ENCOUNTER
Appt scheduled for patient. Pt notified to rest, ice and elevate her foot til her appointment. Pt verbalized understanding. No further questions at this time.

## 2021-06-08 ENCOUNTER — OFFICE VISIT (OUTPATIENT)
Dept: ORTHOPEDICS CLINIC | Facility: CLINIC | Age: 61
End: 2021-06-08
Payer: COMMERCIAL

## 2021-06-08 VITALS — BODY MASS INDEX: 23.54 KG/M2 | HEIGHT: 67 IN | WEIGHT: 150 LBS

## 2021-06-08 DIAGNOSIS — S92.355A CLOSED NONDISPLACED FRACTURE OF FIFTH METATARSAL BONE OF LEFT FOOT, INITIAL ENCOUNTER: Primary | ICD-10-CM

## 2021-06-08 PROCEDURE — L4387 NON-PNEUM WALK BOOT PRE OTS: HCPCS | Performed by: PODIATRIST

## 2021-06-08 PROCEDURE — 3008F BODY MASS INDEX DOCD: CPT | Performed by: PODIATRIST

## 2021-06-08 PROCEDURE — 99203 OFFICE O/P NEW LOW 30 MIN: CPT | Performed by: PODIATRIST

## 2021-06-08 NOTE — PROGRESS NOTES
[facility] New Patient Note    CC: Patient presents with:  Leg or Foot Injury: Patient is here for a fracture to the 5th metatarsal. Patient states that she was coming down the stairs when she check her stocking and when she went to proceed flavio reviewed by me and non-contributory to the chief complaint except as mentioned above. Physical Exam:    Ht 5' 7\" (1.702 m)   Wt 150 lb (68 kg)   BMI 23.49 kg/m²   Neurovascular status is intact distally.   Moderate amount of ecchymosis mild swelling lef

## 2021-06-11 ENCOUNTER — TELEPHONE (OUTPATIENT)
Dept: ORTHOPEDICS CLINIC | Facility: CLINIC | Age: 61
End: 2021-06-11

## 2021-06-11 DIAGNOSIS — S92.355D CLOSED NONDISPLACED FRACTURE OF FIFTH METATARSAL BONE OF LEFT FOOT WITH ROUTINE HEALING, SUBSEQUENT ENCOUNTER: ICD-10-CM

## 2021-06-11 DIAGNOSIS — M79.89 LEFT LEG SWELLING: ICD-10-CM

## 2021-06-11 DIAGNOSIS — M79.605 LEG PAIN, LEFT: Primary | ICD-10-CM

## 2021-06-11 NOTE — TELEPHONE ENCOUNTER
Patient called complaining of calf pain after wearing her low profile boot. I spoke with Dr. Leonce Libman. She ordered a venous doppler.  Patient notified to call and set up an appointment today through [facility].

## 2021-06-16 ENCOUNTER — HOSPITAL ENCOUNTER (OUTPATIENT)
Dept: ULTRASOUND IMAGING | Age: 61
Discharge: HOME | End: 2021-06-16
Attending: PODIATRIST
Payer: COMMERCIAL

## 2021-06-16 ENCOUNTER — HOSPITAL ENCOUNTER (EMERGENCY)
Facility: HOSPITAL | Age: 61
Discharge: HOME | End: 2021-06-16
Attending: EMERGENCY MEDICINE
Payer: COMMERCIAL

## 2021-06-16 VITALS
BODY MASS INDEX: 23.54 KG/M2 | WEIGHT: 150 LBS | HEART RATE: 76 BPM | HEIGHT: 67 IN | TEMPERATURE: 99 F | RESPIRATION RATE: 18 BRPM | SYSTOLIC BLOOD PRESSURE: 132 MMHG | OXYGEN SATURATION: 95 % | DIASTOLIC BLOOD PRESSURE: 78 MMHG

## 2021-06-16 DIAGNOSIS — I82.432 ACUTE DEEP VEIN THROMBOSIS (DVT) OF POPLITEAL VEIN OF LEFT LOWER EXTREMITY (HCC): Primary | ICD-10-CM

## 2021-06-16 DIAGNOSIS — M79.89 LEFT LEG SWELLING: ICD-10-CM

## 2021-06-16 DIAGNOSIS — M79.605 LEG PAIN, LEFT: ICD-10-CM

## 2021-06-16 DIAGNOSIS — S92.355D CLOSED NONDISPLACED FRACTURE OF FIFTH METATARSAL BONE OF LEFT FOOT WITH ROUTINE HEALING, SUBSEQUENT ENCOUNTER: ICD-10-CM

## 2021-06-16 PROCEDURE — 99283 EMERGENCY DEPT VISIT LOW MDM: CPT

## 2021-06-16 PROCEDURE — 93971 EXTREMITY STUDY: CPT | Performed by: PODIATRIST

## 2021-06-16 NOTE — ED PROVIDER NOTES
Patient Seen in: [facility] Emergency Department      History   Patient presents with:  Deep Vein Thrombosis    Stated Complaint: DVT, just had US done and +    HPI/Subjective:   HPI    Patient is a pleasant 22-year-old female who broke her left sammie Conjunctivae normal.      Pupils: Pupils are equal, round, and reactive to light. Cardiovascular:      Rate and Rhythm: Normal rate and regular rhythm. Heart sounds: Normal heart sounds.    Pulmonary:      Effort: Pulmonary effort is normal.      Elisha left foot yesterday. Pain in 4th and 5th toes and lateral left foot. FINDINGS:  BONES:  Normal.  No significant arthropathy or acute abnormality. SOFT TISSUES:  Negative. No visible soft tissue swelling. EFFUSION:  None visible. OTHER:  Negative. transcribed by Technologist)  History of Left fifth metatarsal fracture. Patient complains of Left leg swelling for few days.     FINDINGS:  There is thrombus within the popliteal and 1 of the paired posterior tibial veins of the left leg, this is occlusive

## 2021-06-16 NOTE — CM/SW NOTE
MD requesting Xarelto starter pack for DVT. LM with [facility] outpatient pharmacy. Await call back. 5:00PM - Received a call back from Juany Rothman. Xarelto starter pack available. Will use discount card and bring medication to patient's room.

## 2021-06-16 NOTE — ED INITIAL ASSESSMENT (HPI)
Pain in left  Lower leg since 2 weeks patient has fracture to l;eft lower leg and she is on boot. Today she had ultrasound to left leg advised to go to ER  Because they found some blood clot.

## 2021-06-16 NOTE — CM/SW NOTE
Emergency Department Discharge Plan    Glory Waite is a 59year old female who presented to the ED with DVT. ED Case Manager was asked to assist in arranging for home anticoagulation. Glory Waite and BRADY discussed indications for anticoagulation.  P

## 2021-06-17 ENCOUNTER — PATIENT MESSAGE (OUTPATIENT)
Dept: INTERNAL MEDICINE CLINIC | Facility: CLINIC | Age: 61
End: 2021-06-17

## 2021-06-17 DIAGNOSIS — I82.439 ACUTE DEEP VEIN THROMBOSIS (DVT) OF POPLITEAL VEIN, UNSPECIFIED LATERALITY (HCC): Primary | ICD-10-CM

## 2021-06-17 NOTE — TELEPHONE ENCOUNTER
Spoke with SD ; referral for hematology placed for patient to follow up with for management of xarelto r/t DVT management.      LMTCB 6/17

## 2021-06-17 NOTE — TELEPHONE ENCOUNTER
From: Marylen Sabal  To: Krysta Reno MD  Sent: 6/17/2021 8:41 AM CDT  Subject: Other    I have a fracture in my left foot as of June 2nd. I received a \"temporary cast\" from urgent care the next day and saw Raquel Borja on 6/8.  I told her I had pa

## 2021-06-17 NOTE — TELEPHONE ENCOUNTER
Spoke to pt and answered her questions. Heme contact info given. She will call to schedule. Also plans to f/u with her \"foot dr\" regarding the fracture. Had questions about activity with clot and fracture.  Advised should speak with Dr. Brown Carrier due to

## 2021-06-24 ENCOUNTER — APPOINTMENT (OUTPATIENT)
Dept: CT IMAGING | Facility: HOSPITAL | Age: 61
End: 2021-06-24
Attending: EMERGENCY MEDICINE
Payer: COMMERCIAL

## 2021-06-24 ENCOUNTER — APPOINTMENT (OUTPATIENT)
Dept: CV DIAGNOSTICS | Facility: HOSPITAL | Age: 61
End: 2021-06-24
Attending: SPECIALIST
Payer: COMMERCIAL

## 2021-06-24 ENCOUNTER — APPOINTMENT (OUTPATIENT)
Dept: ULTRASOUND IMAGING | Facility: HOSPITAL | Age: 61
End: 2021-06-24
Attending: SPECIALIST
Payer: COMMERCIAL

## 2021-06-24 ENCOUNTER — HOSPITAL ENCOUNTER (OUTPATIENT)
Facility: HOSPITAL | Age: 61
Setting detail: OBSERVATION
Discharge: HOME | End: 2021-06-25
Attending: EMERGENCY MEDICINE | Admitting: HOSPITALIST
Payer: COMMERCIAL

## 2021-06-24 DIAGNOSIS — I26.94 MULTIPLE SUBSEGMENTAL PULMONARY EMBOLI WITHOUT ACUTE COR PULMONALE (HCC): Primary | ICD-10-CM

## 2021-06-24 DIAGNOSIS — D72.829 LEUKOCYTOSIS, UNSPECIFIED TYPE: ICD-10-CM

## 2021-06-24 DIAGNOSIS — I82.4Z2 ACUTE DEEP VEIN THROMBOSIS (DVT) OF DISTAL VEIN OF LEFT LOWER EXTREMITY (HCC): ICD-10-CM

## 2021-06-24 PROBLEM — R73.9 HYPERGLYCEMIA: Status: ACTIVE | Noted: 2021-06-24

## 2021-06-24 PROCEDURE — 99245 OFF/OP CONSLTJ NEW/EST HI 55: CPT | Performed by: SPECIALIST

## 2021-06-24 PROCEDURE — 93307 TTE W/O DOPPLER COMPLETE: CPT | Performed by: SPECIALIST

## 2021-06-24 PROCEDURE — 93970 EXTREMITY STUDY: CPT | Performed by: SPECIALIST

## 2021-06-24 PROCEDURE — 71275 CT ANGIOGRAPHY CHEST: CPT | Performed by: EMERGENCY MEDICINE

## 2021-06-24 PROCEDURE — 99220 INITIAL OBSERVATION CARE,LEVL III: CPT | Performed by: HOSPITALIST

## 2021-06-24 RX ORDER — MULTIVIT-MIN/IRON/FOLIC ACID/K 18-600-40
1 CAPSULE ORAL DAILY
COMMUNITY
End: 2021-09-17

## 2021-06-24 RX ORDER — MELATONIN
3 NIGHTLY PRN
Status: DISCONTINUED | OUTPATIENT
Start: 2021-06-24 | End: 2021-06-25

## 2021-06-24 RX ORDER — HYDROCODONE BITARTRATE AND ACETAMINOPHEN 5; 325 MG/1; MG/1
1 TABLET ORAL EVERY 4 HOURS PRN
Status: DISCONTINUED | OUTPATIENT
Start: 2021-06-24 | End: 2021-06-25

## 2021-06-24 RX ORDER — MORPHINE SULFATE 4 MG/ML
4 INJECTION, SOLUTION INTRAMUSCULAR; INTRAVENOUS ONCE
Status: DISCONTINUED | OUTPATIENT
Start: 2021-06-24 | End: 2021-06-25

## 2021-06-24 RX ORDER — ALBUTEROL SULFATE 90 UG/1
2 AEROSOL, METERED RESPIRATORY (INHALATION) EVERY 4 HOURS PRN
Status: DISCONTINUED | OUTPATIENT
Start: 2021-06-24 | End: 2021-06-25

## 2021-06-24 RX ORDER — HEPARIN SODIUM AND DEXTROSE 10000; 5 [USP'U]/100ML; G/100ML
INJECTION INTRAVENOUS CONTINUOUS
Status: DISCONTINUED | OUTPATIENT
Start: 2021-06-24 | End: 2021-06-25

## 2021-06-24 RX ORDER — HEPARIN SODIUM 5000 [USP'U]/ML
80 INJECTION INTRAVENOUS; SUBCUTANEOUS ONCE
Status: COMPLETED | OUTPATIENT
Start: 2021-06-24 | End: 2021-06-24

## 2021-06-24 RX ORDER — HYDROCODONE BITARTRATE AND ACETAMINOPHEN 5; 325 MG/1; MG/1
2 TABLET ORAL EVERY 4 HOURS PRN
Status: DISCONTINUED | OUTPATIENT
Start: 2021-06-24 | End: 2021-06-25

## 2021-06-24 RX ORDER — PROCHLORPERAZINE EDISYLATE 5 MG/ML
5 INJECTION INTRAMUSCULAR; INTRAVENOUS EVERY 8 HOURS PRN
Status: DISCONTINUED | OUTPATIENT
Start: 2021-06-24 | End: 2021-06-25

## 2021-06-24 RX ORDER — ACETAMINOPHEN 325 MG/1
650 TABLET ORAL EVERY 4 HOURS PRN
Status: DISCONTINUED | OUTPATIENT
Start: 2021-06-24 | End: 2021-06-25

## 2021-06-24 RX ORDER — ONDANSETRON 2 MG/ML
4 INJECTION INTRAMUSCULAR; INTRAVENOUS EVERY 6 HOURS PRN
Status: DISCONTINUED | OUTPATIENT
Start: 2021-06-24 | End: 2021-06-25

## 2021-06-24 RX ORDER — HEPARIN SODIUM AND DEXTROSE 10000; 5 [USP'U]/100ML; G/100ML
18 INJECTION INTRAVENOUS ONCE
Status: COMPLETED | OUTPATIENT
Start: 2021-06-24 | End: 2021-06-25

## 2021-06-24 NOTE — ED INITIAL ASSESSMENT (HPI)
Pt presents to ed c/o left sided rib and left upper back pain at a 7/10 that worsened over the past 2 days. Pt also states pain is increased on inspiration and is unable to take a deep breath.

## 2021-06-24 NOTE — H&P
[facility]  History and Physical     Grace Miller Patient Status:  Emergency    1960 MRN [id number]   Location [facility] Attending Fred Escalera MD   Hosp Day # 0 PCP Linette Beasley MD     Chief Complaint: CP, Inhalation Aero Soln, Inhale 2 puffs into the lungs every 4 (four) hours as needed for Wheezing., Disp: 1 Inhaler, Rfl: 1  Multiple Vitamin (MULTI VITAMIN DAILY OR), Take by mouth., Disp: , Rfl:         Review of Systems:   A comprehensive 14 point review Creatinine Kinase  No results for input(s): CK in the last 168 hours. Inflammatory Markers  No results for input(s): CRP, LESLY, LDH, DDIMER in the last 168 hours. Imaging: Imaging data reviewed in Epic.       ASSESSMENT / PLAN:     1. Multiple landis

## 2021-06-24 NOTE — CONSULTS
[facility] Consultation Note      Patient Name: Jagdeep Fermin   YOB: 1960  Medical Record Number: [id number]  Consulting Physician: Harpal Sarkar M.D.    Referring Physician: Chiqui Davis MD    Date of Consultation: There is no family history of early onset cardiovascular or cerebrovascular disease. Patient's last colonoscopy was 4 years ago. Her last mammogram was in 2019. Past Medical History   DVT and PE (as above); left 5th metatarsal fracture.      Past [place] Cardiovascular     No anginal chest pain, palpitations, edema, orthopnea. Gastrointestinal   No melena, hematochezia. Genitourinary      No hematuria, vaginal bleeding. Musculoskeletal   As per HPI. Integumentary      No acute or chronic rashes.   Vania Gap 5 0 - 18 mmol/L    BUN 13 7 - 18 mg/dL    Creatinine 0.81 0.55 - 1.02 mg/dL    BUN/CREA Ratio 16.0 10.0 - 20.0    Calcium, Total 9.4 8.5 - 10.1 mg/dL    Calculated Osmolality 287 275 - 295 mOsm/kg    GFR, Non- 78 >=60    GFR, -Am 0.10 - 1.00 x10(3) uL    Eosinophil Absolute 0.11 0.00 - 0.70 x10(3) uL    Basophil Absolute 0.13 0.00 - 0.20 x10(3) uL    Immature Granulocyte Absolute 0.07 0.00 - 1.00 x10(3) uL    Neutrophil % 79.6 %    Lymphocyte % 11.1 %    Monocyte % 7.4 %    Eosinop  Trace left pleural fluid.       THORACIC AORTA:  No thoracic aortic aneurysm.       MEDIASTINUM/JL:  No pathologically enlarged nodes.       CARDIAC:  Unremarkable.       CHEST WALL:  Unremarkable.       LIMITED ABDOMEN:  No acute process seen.       MATEUS and proximal and   distal posterior tibial vein. The superficial femoral, common femoral and profunda femoral veins and the saphenofemoral junction are patent.      Dictated by (CST): Vidhi Burden MD on 6/16/2021 at 3:58 PM       Finalized by (CST): Leonardo record.               Dictated by (CST): Soniya Cotton MD on 6/16/2021 at 3:32 PM       Finalized by (CST): Soniya Cotton MD on 6/16/2021 at 3:41 PM           Impression and Plan   1.    Bilateral pulmonary emboli: Patient diagnosed with DVT on 06/16/2021 a

## 2021-06-24 NOTE — ED PROVIDER NOTES
Patient Seen in: [facility] Emergency Department      History   Patient presents with:  Pain    Stated Complaint: Multiple blood clots on xarelto, head, neck, and, back pain, feels SOB, 99% RA    HPI/Subjective:   HPI    33-year-old female presents Physical Exam    All measures to prevent infection transmission during my interaction with the patient were taken. The patient was already wearing a droplet mask on my arrival to the room.  Personal protective equipment including droplet mask, eye p Neutrophil Absolute Prelim 12.48 (*)     Neutrophil Absolute 12.48 (*)     Monocyte Absolute 1.16 (*)     All other components within normal limits   TROPONIN I - Normal   CBC WITH DIFFERENTIAL WITH PLATELET    Narrative:      The following orders were crea required my constant attention. Time was spent ordering, reviewing, and interpreting ancillary testing and imaging. The patient was frequently reevaluated, and I made frequent checks of  vital signs and monitor. Nursing notes were reviewed.      Critical ca List                          Hospital Problems     Present on Admission  Date Reviewed: 6/8/2021        ICD-10-CM Noted POA    * (Principal) Multiple subsegmental pulmonary emboli without acute cor pulmonale (HCC) I26.94 6/24/2021 Unknown    Hyperglycemia

## 2021-06-24 NOTE — PLAN OF CARE
Pt denies c/o malaise or cardiac symptoms. A&Ox4. Lungs clear bilaterally with equal expansion, on room air. Pt NSR on monitor with regular rate. Abdomen soft and non-tender with active bowel sounds in all four quadrants. Continent of B&B.  Pt rates pain 7/ pain and evaluate response  - Implement non-pharmacological measures as appropriate and evaluate response  - Consider cultural and social influences on pain and pain management  - Manage/alleviate anxiety  - Utilize distraction and/or relaxation techniques

## 2021-06-25 ENCOUNTER — APPOINTMENT (OUTPATIENT)
Dept: HEMATOLOGY/ONCOLOGY | Facility: HOSPITAL | Age: 61
End: 2021-06-25
Attending: INTERNAL MEDICINE
Payer: COMMERCIAL

## 2021-06-25 VITALS
OXYGEN SATURATION: 96 % | RESPIRATION RATE: 18 BRPM | BODY MASS INDEX: 24.43 KG/M2 | TEMPERATURE: 99 F | SYSTOLIC BLOOD PRESSURE: 126 MMHG | HEIGHT: 67 IN | DIASTOLIC BLOOD PRESSURE: 56 MMHG | WEIGHT: 155.63 LBS | HEART RATE: 80 BPM

## 2021-06-25 PROCEDURE — 99226 SUBSEQUENT OBSERVATION CARE: CPT | Performed by: SPECIALIST

## 2021-06-25 PROCEDURE — 99217 OBSERVATION CARE DISCHARGE: CPT | Performed by: STUDENT IN AN ORGANIZED HEALTH CARE EDUCATION/TRAINING PROGRAM

## 2021-06-25 NOTE — PROGRESS NOTES
NURSING DISCHARGE NOTE    Discharged Home via Wheelchair. Accompanied by Support staff  Belongings Taken by patient/family. Patient discharged to home.  Discharge instructions given to and understood by patient and patient's , Vivian Marcus, at bedside

## 2021-06-25 NOTE — PROGRESS NOTES
06/25/21 1112 06/25/21 1114 06/25/21 1115   Vital Signs   /58 105/75 126/56   MAP (mmHg) 70 79 71   BP Location Left arm Left arm Left arm   BP Method Automatic Automatic Automatic   Patient Position Lying Sitting Standing

## 2021-06-25 NOTE — DISCHARGE SUMMARY
[facility] HOSPITALIST  DISCHARGE SUMMARY     Niko Lugo Patient Status:  Observation    1960 MRN [id number]   [facility] 8NE-A Attending Rony Connell MD   Hosp Day # 0 PCP Chance Clemons MD     Date of Admission: 2021  Date of Score: 63  59-90 High Risk  29-58 Medium Risk  0-28   Low Risk         TCM Follow-Up Recommendation:  LACE > 58:  High Risk of readmission after discharge from the hospital.    Procedures during hospitalization:   • Echo, Doppler LE, CTA    Incidental or si YOU NEED LABWORK OR AN INFUSION ALONG WITH YOUR APPOINTMENT, YOU MUST CALL TO SCHEDULE.**  Your appointment is on the [address] [place] [address] in the [facility]. The address is [address] [place], [place] [address].  Please register at the [facility] unless your physician's office submitted the order electronically or faxed the order. Without the order, your test may be delayed or postponed.     Children: Children under the age of 15 must have another adult caregiver with them.&nbsp; Please do not bring visitors and patients will be screened for a temperature greater than 100 degrees before entering our sites. -[facility] is allowed for all provider appointments, and in the infusion area only for the first chemotherapy/immunotherapy appointment. -Al

## 2021-06-25 NOTE — PROGRESS NOTES
THE [facility] Progress Note      Patient Name: Shireen Restrepo   YOB: 1960  Medical Record Number: [id number]    Subjective  Patient states that her left sided pleuritic chest pain is a little better. No shortness of breath. effort; no respiratory distress. Cardiovascular     Regular rate and rhythm. Extremities          Bilateral lower extremities without edema. Neurologic           Motor and sensory grossly intact.   Psychiatric          Mood and affect appropriate; coher Value Ref Range    PTT 46.2 (H) 25.4 - 36.1 seconds   CBC W/ DIFFERENTIAL    Collection Time: 06/24/21  3:06 AM   Result Value Ref Range    WBC 15.7 (H) 4.0 - 11.0 x10(3) uL    RBC 5.06 3.80 - 5.30 x10(6)uL    HGB 16.2 (H) 12.0 - 16.0 g/dL    HCT 47.1 35.0 Range    WBC 15.4 (H) 4.0 - 11.0 x10(3) uL    RBC 4.52 3.80 - 5.30 x10(6)uL    HGB 14.4 12.0 - 16.0 g/dL    HCT 41.7 35.0 - 48.0 %    .0 150.0 - 450.0 10(3)uL    MCV 92.3 80.0 - 100.0 fL    MCH 31.9 26.0 - 34.0 pg    MCHC 34.5 31.0 - 37.0 g/dL    RD Impression   CONCLUSION:  Improvement, now with only 1 single level of partial clot in the proximal aspect of the left posterior tibial vein, with clearance of previously demonstrated clot from the proximal popliteal, distal popliteal, mid posterior tibi pressure: 31mm Hg (S). PA pressure: 10mm Hg      (ED).     *     ----------------------------------------------------------------------------   *   Left ventricle:  The cavity size was normal. Wall thickness was normal.   Systolic function was vigorous.  Nestor Carvalho Value        Reference    LV ID, ED, PLAX                        4.0   cm     3.9 - 5.3    LV ID, ES, PLAX                        2.2   cm     ---------    LV fx shortening, PLAX                 45    %      27 - 45    LV PW thickness, ED, PLAX                            5     mm Hg  ---------        Right ventricle                        Value        Reference    RV pressure, S, DP                     31    mm Hg  ---------        Pulmonic valve                         Value        Reference    Pulmonic reg

## 2021-06-25 NOTE — PLAN OF CARE
Patient alert and oriented x4. Up with standby assist. NSR on tele. On RA. On hep gtt. Complains of neck pain, PRN medication administered. Continent of bowel and bladder. POC hep gtt and pain control. Call light within reach, will continue to monitor. management  - Manage/alleviate anxiety  - Utilize distraction and/or relaxation techniques  - Monitor for opioid side effects  - Notify MD/LIP if interventions unsuccessful or patient reports new pain  - Anticipate increased pain with activity and pre-medi

## 2021-06-25 NOTE — PLAN OF CARE
Assumed care for pt at 19:30 on 6/24    A&Ox4  Pain controlled with tylenol and ice packs. Pt avoiding norco d/t nausea. VSS on RA. NSR on tele. Heparin infusing @ 10ml/hr per DVT/PE protocol, therapeutic PTT 89.8. Continent of B&B, up ad jenaro.     Plan: Hep

## 2021-06-28 ENCOUNTER — PATIENT OUTREACH (OUTPATIENT)
Dept: CASE MANAGEMENT | Age: 61
End: 2021-06-28

## 2021-06-29 ENCOUNTER — HOSPITAL ENCOUNTER (OUTPATIENT)
Dept: GENERAL RADIOLOGY | Age: 61
Discharge: HOME | End: 2021-06-29
Attending: PODIATRIST
Payer: COMMERCIAL

## 2021-06-29 ENCOUNTER — OFFICE VISIT (OUTPATIENT)
Dept: ORTHOPEDICS CLINIC | Facility: CLINIC | Age: 61
End: 2021-06-29
Payer: COMMERCIAL

## 2021-06-29 VITALS — HEART RATE: 83 BPM | OXYGEN SATURATION: 99 %

## 2021-06-29 DIAGNOSIS — S92.355A CLOSED NONDISPLACED FRACTURE OF FIFTH METATARSAL BONE OF LEFT FOOT, INITIAL ENCOUNTER: ICD-10-CM

## 2021-06-29 DIAGNOSIS — S92.355D CLOSED NONDISPLACED FRACTURE OF FIFTH METATARSAL BONE OF LEFT FOOT WITH ROUTINE HEALING, SUBSEQUENT ENCOUNTER: Primary | ICD-10-CM

## 2021-06-29 PROCEDURE — 99213 OFFICE O/P EST LOW 20 MIN: CPT | Performed by: PODIATRIST

## 2021-06-29 PROCEDURE — 73630 X-RAY EXAM OF FOOT: CPT | Performed by: PODIATRIST

## 2021-06-29 NOTE — PROGRESS NOTES
[facility] Progress Note    Subjective: Leeroy Nevarez is here now 3 weeks post injury to her fifth metatarsal left foot with history of DVT and several days later development of shortness of breath and pulmonary embolism  She is on Xarelto and was shortly

## 2021-07-01 ENCOUNTER — HOSPITAL ENCOUNTER (OUTPATIENT)
Dept: ULTRASOUND IMAGING | Age: 61
Discharge: HOME | End: 2021-07-01
Attending: SPECIALIST
Payer: COMMERCIAL

## 2021-07-01 DIAGNOSIS — M54.2 PAIN, NECK: ICD-10-CM

## 2021-07-01 DIAGNOSIS — R22.1 NECK SWELLING: ICD-10-CM

## 2021-07-01 PROCEDURE — 93971 EXTREMITY STUDY: CPT | Performed by: SPECIALIST

## 2021-07-07 NOTE — PROGRESS NOTES
THE [facility] Progress Note      Patient Name: Herminia Miller   YOB: 1960  Medical Record Number: [id number]  Attending Physician: Jenelle Farley. Kristina Garcia M.D.      Date of Visit: 7/9/2021       Chief Complaint  Bilateral pulmonary pleuritic chest pain are better. She denies significant shortness of breath. She reports mild dry cough but no fevers or chills. No melena, bright red blood per rectum, vaginal bleeding, hematuria. Her left foot remains in a boot.     Past Medical History ( affect appropriate.       Laboratory   Recent Results (from the past 48 hour(s))   CBC W/ DIFFERENTIAL    Collection Time: 07/09/21  8:38 AM   Result Value Ref Range    WBC 11.1 (H) 4.0 - 11.0 x10(3) uL    RBC 4.99 3.80 - 5.30 x10(6)uL    HGB 15.5 12.0 - 16

## 2021-07-09 ENCOUNTER — OFFICE VISIT (OUTPATIENT)
Dept: HEMATOLOGY/ONCOLOGY | Facility: HOSPITAL | Age: 61
End: 2021-07-09
Attending: INTERNAL MEDICINE
Payer: COMMERCIAL

## 2021-07-09 VITALS
OXYGEN SATURATION: 95 % | HEART RATE: 78 BPM | RESPIRATION RATE: 16 BRPM | TEMPERATURE: 96 F | BODY MASS INDEX: 24 KG/M2 | WEIGHT: 155 LBS | DIASTOLIC BLOOD PRESSURE: 75 MMHG | SYSTOLIC BLOOD PRESSURE: 144 MMHG

## 2021-07-09 DIAGNOSIS — I82.4Z2 ACUTE DEEP VEIN THROMBOSIS (DVT) OF DISTAL VEIN OF LEFT LOWER EXTREMITY (HCC): ICD-10-CM

## 2021-07-09 DIAGNOSIS — D72.825 BANDEMIA: Primary | ICD-10-CM

## 2021-07-09 DIAGNOSIS — I26.99 BILATERAL PULMONARY EMBOLISM (HCC): ICD-10-CM

## 2021-07-09 LAB
BASOPHILS # BLD AUTO: 0.15 X10(3) UL (ref 0–0.2)
BASOPHILS NFR BLD AUTO: 1.4 %
DEPRECATED RDW RBC AUTO: 50.7 FL (ref 35.1–46.3)
EOSINOPHIL # BLD AUTO: 0.13 X10(3) UL (ref 0–0.7)
EOSINOPHIL NFR BLD AUTO: 1.2 %
ERYTHROCYTE [DISTWIDTH] IN BLOOD BY AUTOMATED COUNT: 14.7 % (ref 11–15)
HCT VFR BLD AUTO: 47.2 %
HGB BLD-MCNC: 15.5 G/DL
IMM GRANULOCYTES # BLD AUTO: 0.04 X10(3) UL (ref 0–1)
IMM GRANULOCYTES NFR BLD: 0.4 %
LYMPHOCYTES # BLD AUTO: 2.07 X10(3) UL (ref 1–4)
LYMPHOCYTES NFR BLD AUTO: 18.7 %
MCH RBC QN AUTO: 31.1 PG (ref 26–34)
MCHC RBC AUTO-ENTMCNC: 32.8 G/DL (ref 31–37)
MCV RBC AUTO: 94.6 FL
MONOCYTES # BLD AUTO: 0.87 X10(3) UL (ref 0.1–1)
MONOCYTES NFR BLD AUTO: 7.9 %
NEUTROPHILS # BLD AUTO: 7.79 X10 (3) UL (ref 1.5–7.7)
NEUTROPHILS # BLD AUTO: 7.79 X10(3) UL (ref 1.5–7.7)
NEUTROPHILS NFR BLD AUTO: 70.4 %
PLATELET # BLD AUTO: 412 10(3)UL (ref 150–450)
RBC # BLD AUTO: 4.99 X10(6)UL
WBC # BLD AUTO: 11.1 X10(3) UL (ref 4–11)

## 2021-07-09 PROCEDURE — 99214 OFFICE O/P EST MOD 30 MIN: CPT | Performed by: SPECIALIST

## 2021-07-16 LAB — BCR-ABL1, T(9;22) QUAL, RT-PCR: NOT DETECTED

## 2021-07-20 ENCOUNTER — OFFICE VISIT (OUTPATIENT)
Dept: ORTHOPEDICS CLINIC | Facility: CLINIC | Age: 61
End: 2021-07-20
Payer: COMMERCIAL

## 2021-07-20 ENCOUNTER — HOSPITAL ENCOUNTER (OUTPATIENT)
Dept: GENERAL RADIOLOGY | Age: 61
Discharge: HOME | End: 2021-07-20
Attending: PODIATRIST
Payer: COMMERCIAL

## 2021-07-20 VITALS — OXYGEN SATURATION: 99 % | HEART RATE: 83 BPM

## 2021-07-20 DIAGNOSIS — S92.355D CLOSED NONDISPLACED FRACTURE OF FIFTH METATARSAL BONE OF LEFT FOOT WITH ROUTINE HEALING, SUBSEQUENT ENCOUNTER: ICD-10-CM

## 2021-07-20 DIAGNOSIS — Z86.718 HISTORY OF DVT (DEEP VEIN THROMBOSIS): ICD-10-CM

## 2021-07-20 DIAGNOSIS — S92.352D CLOSED DISPLACED FRACTURE OF FIFTH METATARSAL BONE OF LEFT FOOT WITH ROUTINE HEALING, SUBSEQUENT ENCOUNTER: Primary | ICD-10-CM

## 2021-07-20 PROCEDURE — 99213 OFFICE O/P EST LOW 20 MIN: CPT | Performed by: PODIATRIST

## 2021-07-20 PROCEDURE — 73630 X-RAY EXAM OF FOOT: CPT | Performed by: PODIATRIST

## 2021-07-20 NOTE — PROGRESS NOTES
[facility] Progress Note    Subjective: She is here for follow-up of 1/5 metatarsal fracture of the left foot. Complicated by the development of a DVT and PE.  She still has some shortness of breath but overall is doing well and feels that she is p

## 2021-08-25 ENCOUNTER — OFFICE VISIT (OUTPATIENT)
Dept: ORTHOPEDICS CLINIC | Facility: CLINIC | Age: 61
End: 2021-08-25
Payer: COMMERCIAL

## 2021-08-25 VITALS — HEART RATE: 75 BPM | OXYGEN SATURATION: 99 %

## 2021-08-25 DIAGNOSIS — S92.355D CLOSED NONDISPLACED FRACTURE OF FIFTH METATARSAL BONE OF LEFT FOOT WITH ROUTINE HEALING, SUBSEQUENT ENCOUNTER: Primary | ICD-10-CM

## 2021-08-25 PROCEDURE — 99024 POSTOP FOLLOW-UP VISIT: CPT | Performed by: PODIATRIST

## 2021-08-25 RX ORDER — RIVAROXABAN 20 MG/1
20 TABLET, FILM COATED ORAL
COMMUNITY
Start: 2021-08-11 | End: 2021-12-16 | Stop reason: ALTCHOICE

## 2021-09-14 ENCOUNTER — HOSPITAL ENCOUNTER (OUTPATIENT)
Dept: ULTRASOUND IMAGING | Age: 61
Discharge: HOME | End: 2021-09-14
Attending: SPECIALIST
Payer: COMMERCIAL

## 2021-09-14 ENCOUNTER — HOSPITAL ENCOUNTER (OUTPATIENT)
Dept: CT IMAGING | Facility: HOSPITAL | Age: 61
Discharge: HOME | End: 2021-09-14
Attending: SPECIALIST
Payer: COMMERCIAL

## 2021-09-14 DIAGNOSIS — I82.4Z2 ACUTE DEEP VEIN THROMBOSIS (DVT) OF DISTAL VEIN OF LEFT LOWER EXTREMITY (HCC): ICD-10-CM

## 2021-09-14 LAB — CREAT BLD-MCNC: 0.8 MG/DL

## 2021-09-14 PROCEDURE — 93971 EXTREMITY STUDY: CPT | Performed by: SPECIALIST

## 2021-09-14 PROCEDURE — 71275 CT ANGIOGRAPHY CHEST: CPT | Performed by: SPECIALIST

## 2021-09-14 PROCEDURE — 82565 ASSAY OF CREATININE: CPT

## 2021-09-17 ENCOUNTER — OFFICE VISIT (OUTPATIENT)
Dept: HEMATOLOGY/ONCOLOGY | Facility: HOSPITAL | Age: 61
End: 2021-09-17
Attending: SPECIALIST
Payer: COMMERCIAL

## 2021-09-17 VITALS
TEMPERATURE: 98 F | OXYGEN SATURATION: 98 % | HEART RATE: 77 BPM | RESPIRATION RATE: 16 BRPM | HEIGHT: 67.01 IN | WEIGHT: 159.19 LBS | BODY MASS INDEX: 24.99 KG/M2 | DIASTOLIC BLOOD PRESSURE: 75 MMHG | SYSTOLIC BLOOD PRESSURE: 144 MMHG

## 2021-09-17 DIAGNOSIS — M79.89 SWELLING OF LEFT FOOT: ICD-10-CM

## 2021-09-17 DIAGNOSIS — I26.99 BILATERAL PULMONARY EMBOLISM (HCC): ICD-10-CM

## 2021-09-17 DIAGNOSIS — I82.4Z2 ACUTE DEEP VEIN THROMBOSIS (DVT) OF DISTAL VEIN OF LEFT LOWER EXTREMITY (HCC): ICD-10-CM

## 2021-09-17 DIAGNOSIS — D72.825 BANDEMIA: Primary | ICD-10-CM

## 2021-09-17 PROCEDURE — 99214 OFFICE O/P EST MOD 30 MIN: CPT | Performed by: SPECIALIST

## 2021-09-17 NOTE — PROGRESS NOTES
Patient is here today for follow up with Orkelly Perry for DVT and PE - patient is currently on Xarelto 20mg . Patient stated pain in left foot. Medication list and medical history were reviewed and updated.      Education Record    Learner:  Patient and spou

## 2021-09-17 NOTE — PROGRESS NOTES
[facility] Progress Note      Patient Name: Luis Rothman   YOB: 1960  Medical Record Number: [id number]  Attending Physician: Leonela Nguyen. Pete Brooks M.D.      Date of Visit: 9/17/2021      Chief Complaint  Bilateral pulmonary bleeding. Past Medical History (historical data, reviewed by physician)  DVT and PE (as above); left 5th metatarsal fracture. Past Surgical History (historical data, reviewed by physician)  Laparoscopic uterine fibroids; tonsillectomy.      Family H extremity venous system.  B-mode two-dimensional images of the vascular structures, Doppler spectral analysis, and color flow.  Doppler imaging were performed.  The   following veins were imaged:  Common, deep, and superficial femoral, popliteal, sapheno-fe airways appear patent.  There is no bronchiectasis or peribronchial thickening.  Minimal basilar atelectasis versus scarring is present. Rommie Ruff is no focal consolidation.  Probable mild left basilar atelectasis versus prior infarct   present overlying the

## 2021-10-08 RX ORDER — RIVAROXABAN 20 MG/1
TABLET, FILM COATED ORAL
Qty: 30 TABLET | Refills: 0 | OUTPATIENT
Start: 2021-10-08

## 2021-12-16 ENCOUNTER — OFFICE VISIT (OUTPATIENT)
Dept: INTERNAL MEDICINE CLINIC | Facility: CLINIC | Age: 61
End: 2021-12-16
Payer: COMMERCIAL

## 2021-12-16 ENCOUNTER — TELEPHONE (OUTPATIENT)
Dept: INTERNAL MEDICINE CLINIC | Facility: CLINIC | Age: 61
End: 2021-12-16

## 2021-12-16 VITALS
RESPIRATION RATE: 16 BRPM | BODY MASS INDEX: 25.45 KG/M2 | HEART RATE: 76 BPM | WEIGHT: 162.13 LBS | TEMPERATURE: 98 F | OXYGEN SATURATION: 98 % | HEIGHT: 67.01 IN | SYSTOLIC BLOOD PRESSURE: 128 MMHG | DIASTOLIC BLOOD PRESSURE: 66 MMHG

## 2021-12-16 DIAGNOSIS — L29.0 RECTAL ITCHING: Primary | ICD-10-CM

## 2021-12-16 DIAGNOSIS — K64.9 HEMORRHOIDS, UNSPECIFIED HEMORRHOID TYPE: ICD-10-CM

## 2021-12-16 DIAGNOSIS — Z12.31 ENCOUNTER FOR SCREENING MAMMOGRAM FOR MALIGNANT NEOPLASM OF BREAST: ICD-10-CM

## 2021-12-16 PROBLEM — D72.829 LEUKOCYTOSIS: Status: RESOLVED | Noted: 2021-06-24 | Resolved: 2021-12-16

## 2021-12-16 PROBLEM — Z86.711 HISTORY OF PULMONARY EMBOLISM: Status: ACTIVE | Noted: 2021-06-24

## 2021-12-16 PROBLEM — D72.829 LEUKOCYTOSIS, UNSPECIFIED TYPE: Status: RESOLVED | Noted: 2021-06-24 | Resolved: 2021-12-16

## 2021-12-16 PROBLEM — Z86.718 HISTORY OF DVT IN ADULTHOOD: Status: ACTIVE | Noted: 2021-06-24

## 2021-12-16 PROCEDURE — 99213 OFFICE O/P EST LOW 20 MIN: CPT | Performed by: NURSE PRACTITIONER

## 2021-12-16 PROCEDURE — 3008F BODY MASS INDEX DOCD: CPT | Performed by: NURSE PRACTITIONER

## 2021-12-16 PROCEDURE — 3074F SYST BP LT 130 MM HG: CPT | Performed by: NURSE PRACTITIONER

## 2021-12-16 PROCEDURE — 3078F DIAST BP <80 MM HG: CPT | Performed by: NURSE PRACTITIONER

## 2021-12-16 RX ORDER — HYDROCORTISONE 25 MG/G
1 CREAM TOPICAL 2 TIMES DAILY
Qty: 30 G | Refills: 0 | Status: SHIPPED | OUTPATIENT
Start: 2021-12-16

## 2021-12-16 NOTE — TELEPHONE ENCOUNTER
Spoke with pt. Pt said that she has a rash in her rectal area for the past month. Rash is bright red and very itchy. Originally thought it was because she was using fragrance free wipes. Stopped using these but sx still persist. Tried using preparation H due to having hemorrhoids and Vaseline with no releif. Scheduled pt with SD today at 2:40. When completing travel screening, pt said that she got her booster on 12/9. Had HA/fatigue on 12/10, which she did have with the previous vaccines as well. Sx were resolved by 12/11. Routing to SD. This ok?

## 2021-12-16 NOTE — TELEPHONE ENCOUNTER
Patient is experiencing a redness in the rectal area for about a month. Now it's very irritated and red and very itchy.     Please advise

## 2021-12-16 NOTE — PROGRESS NOTES
Heidi Castaneda is a 59year old female. Patient presents with:  Rash  Hemorrhoids      HPI:   Presents for eval of rectal rash. Hx of hemorrhoids and bleeding. Over the past month notes rash with increased itching of the rectal area. Worse at night.   Westly Gathers developed, well nourished,in no apparent distress  GI: normal bowel sounds, no masses, HSM or tenderness  Rectum:  External nonthrombosed hemorrhoid. Surround erythema of rectum.     EXTREMITIES: no edema, normal strength and tone  PSYCH: alert and oriente

## 2022-12-04 ENCOUNTER — ORDER TRANSCRIPTION (OUTPATIENT)
Dept: ADMINISTRATIVE | Facility: HOSPITAL | Age: 62
End: 2022-12-04

## 2022-12-04 DIAGNOSIS — Z13.6 SCREENING FOR CARDIOVASCULAR CONDITION: Primary | ICD-10-CM

## 2022-12-05 ENCOUNTER — HOSPITAL ENCOUNTER (OUTPATIENT)
Dept: CT IMAGING | Facility: HOSPITAL | Age: 62
End: 2022-12-05
Attending: INTERNAL MEDICINE

## 2022-12-05 DIAGNOSIS — Z13.6 SCREENING FOR CARDIOVASCULAR CONDITION: ICD-10-CM

## 2022-12-05 DIAGNOSIS — Z13.6 SCREENING FOR HEART DISEASE: ICD-10-CM

## 2023-01-13 PROBLEM — Z80.0 FAMILY HISTORY OF COLON CANCER: Status: ACTIVE | Noted: 2023-01-13

## 2023-01-13 PROBLEM — Z86.010 HISTORY OF ADENOMATOUS POLYP OF COLON: Status: ACTIVE | Noted: 2023-01-13

## 2023-01-13 PROBLEM — D12.0 BENIGN NEOPLASM OF CECUM: Status: ACTIVE | Noted: 2023-01-13

## 2023-01-13 PROBLEM — D12.2 BENIGN NEOPLASM OF ASCENDING COLON: Status: ACTIVE | Noted: 2023-01-13

## 2023-10-17 NOTE — TELEPHONE ENCOUNTER
MRI without any lesions and no further follow up needed for that. Dec '19 U/S liver/gallbladder with small possible polyps with repeat U/S in 1 year indicated. Order placed.      - Patient phoned with recommendation and can follow up Dec '20 Expected Date Of Service: 05/24/2023 Billing Type: [employer] Bill For Surgical Tray: no

## 2023-10-30 ENCOUNTER — HOSPITAL ENCOUNTER (OUTPATIENT)
Age: 63
Discharge: HOME | End: 2023-10-30
Payer: COMMERCIAL

## 2023-10-30 ENCOUNTER — APPOINTMENT (OUTPATIENT)
Dept: GENERAL RADIOLOGY | Age: 63
End: 2023-10-30
Attending: PHYSICIAN ASSISTANT
Payer: COMMERCIAL

## 2023-10-30 VITALS
OXYGEN SATURATION: 98 % | BODY MASS INDEX: 24.33 KG/M2 | RESPIRATION RATE: 16 BRPM | TEMPERATURE: 98 F | DIASTOLIC BLOOD PRESSURE: 75 MMHG | HEART RATE: 85 BPM | HEIGHT: 67 IN | SYSTOLIC BLOOD PRESSURE: 154 MMHG | WEIGHT: 155 LBS

## 2023-10-30 DIAGNOSIS — M79.642 LEFT HAND PAIN: Primary | ICD-10-CM

## 2023-10-30 DIAGNOSIS — M18.12 LOCALIZED PRIMARY OSTEOARTHRITIS OF FIRST CARPOMETACARPAL JOINT OF LEFT WRIST: ICD-10-CM

## 2023-10-30 PROCEDURE — 99213 OFFICE O/P EST LOW 20 MIN: CPT | Performed by: PHYSICIAN ASSISTANT

## 2023-10-30 PROCEDURE — L3924 HFO WITHOUT JOINTS PRE OTS: HCPCS | Performed by: PHYSICIAN ASSISTANT

## 2023-10-30 PROCEDURE — 73130 X-RAY EXAM OF HAND: CPT | Performed by: PHYSICIAN ASSISTANT

## 2023-10-30 RX ORDER — ALENDRONATE SODIUM 70 MG/1
TABLET ORAL
COMMUNITY

## 2023-10-30 NOTE — ED INITIAL ASSESSMENT (HPI)
Pt has had left hand and wrist pain for about 3 months, and it has not improved,  has a PMH of carpal tunnel to rt wrist

## 2025-07-23 NOTE — PROGRESS NOTES
[facility] Progress Note    Subjective:   Pt ;here for follow up of 5th metatarsal fracture left foot  Some odd feelings near 5th toe  occassional pains in foot but overall good      Objective:   Left leg no warmth, no swelling, no pain  Minimal p Detail Level: Zone Initiate Treatment: ciclopirox 1 % shampoo \\nQuantity: 120.0 ml  Days Supply: 30\\nSig: Apply to scalp every other week let sit for 5-10mins then rinse & follow with hydrating shampoo and conditioner\\n\\nfluocinonide 0.05 % topical solution \\nQuantity: 60.0 ml  Days Supply: 30\\nSig: Apply to scalp qd for 7 days as needed for itching and flaking Render In Strict Bullet Format?: No Initiate Treatment: Soolantra 1 % topical cream \\nQuantity: 45.0 g  Days Supply: 30\\nSig: Apply to face qam\\n\\ntretinoin 0.05 % topical cream \\nQuantity: 20.0 g  Days Supply: 30\\nSig: Apply a pea sized amount to face every other night after moisturizer\\n\\ndoxycycline monohydrate 100 mg capsule \\nQuantity: 20.0 Capsule  Days Supply: 20\\nSig: Take one capsule po QD with a glass of water and meal x 30days Samples Given: La Roche Posay cicaplast

## (undated) NOTE — ED AVS SNAPSHOT
Jess Kaur   MRN: [id number]    Department:  [facility] Emergency Department   Date of Visit:  2/13/2018           Disclosure     Insurance plans vary and the physician(s) referred by the ER may not be covered by your plan.  Please contact your tell this physician (or your personal doctor if your instructions are to return to your personal doctor) about any new or lasting problems. The primary care or specialist physician will see patients referred from the [facility] Emergency Department.  Rufino Duran

## (undated) NOTE — LETTER
12/06/19        Lillian Gutierrez  [address] [facility] [address]      Dear Serafin Ponce,    [address] [facility] records indicate that you have outstanding lab work and or testing that was ordered for you and has not yet been completed:  Orders Placed This Encounter      Lipid

## (undated) NOTE — LETTER
ASTHMA ACTION PLAN for Rosi Jack     : 1960     Date: 2018  Provider:  JOHNNY Lira  Phone for doctor or clinic: [address] [facility], [address] [address] [facility], [address] [place] [address], [address] [place] [address] [phone]

## (undated) NOTE — LETTER
01/17/22        Heidi Castaneda  [address] [facility] [address]      Dear Jared Cohen,    [address] [facility] records indicate that you have outstanding lab work and or testing that was ordered for you and has not yet been completed:  Orders Placed This Encounter      KM TO

## (undated) NOTE — Clinical Note
ASTHMA ACTION PLAN for Daniella Marc     : 1960     Date: 2017  Provider:  JOHNNY Lorenzana  Phone for doctor or clinic: [address] [facility], [address] [facility], [address] [place] [address] [phone]

## (undated) NOTE — LETTER
ASTHMA ACTION PLAN for Daniella Marc     : 1960     Date: 3/13/2019  Provider:  Suzanne Archibald PA-C  Phone for doctor or clinic: [address] [facility], [address] [facility], [address] [place] [address] [phone]